# Patient Record
(demographics unavailable — no encounter records)

---

## 2025-03-26 NOTE — HISTORY OF PRESENT ILLNESS
[FreeTextEntry1] : 57-year-old female PMH: ,  CVA presenting has right hemiparesis approx 2019, back surgery  now chronic back pain, HLD   PCP: Dr Harman Castillo  Previous Cardio: Dr Skinner   3/25/2025 Patient presents for cardiac evaluation, previously seen by Dr. Sumner many years ago.  Patient has history of CVA over 5 years ago, had cardiac work up including bubble study r/o PFO at the time. No chest pain, shortness of breath, palpitations, dizziness/lightheadedness, pre-syncope/syncope.  Never smoked.  Not exercising due to chronic back Diet: chocolate, rare meat  no gestational DM, HTN, or preeclampsia.

## 2025-03-26 NOTE — ASSESSMENT
[FreeTextEntry1] : Assessment:  #Hxo CVA 2019 #HLD not on lipid lowering therapy 3/2025 #Prevention - Normotensive, never smoker  2/24/2025 , , HDL 81,   K 5.5, eGFR 102, Cr 0.66 A1c 5.7% TSH 1.10 Hgb 13.2  Plan: - Echo - Carotid US for screening - Start rosuvastatin 10 mg daily, discussed SEs, labs in 12 weeks - Continue ASA 81 mg daily - RTC 3-4 months after testing